# Patient Record
Sex: FEMALE | Race: BLACK OR AFRICAN AMERICAN | Employment: UNEMPLOYED | URBAN - METROPOLITAN AREA
[De-identification: names, ages, dates, MRNs, and addresses within clinical notes are randomized per-mention and may not be internally consistent; named-entity substitution may affect disease eponyms.]

---

## 2021-07-27 ENCOUNTER — TELEPHONE (OUTPATIENT)
Dept: NEUROLOGY | Age: 61
End: 2021-07-27

## 2021-07-27 NOTE — TELEPHONE ENCOUNTER
[facility] wants to know if we can treat this patient. Referral has been dropped off to Dr. Michelle Farr nurse. Please call back once we have answer.

## 2021-11-16 ENCOUNTER — OFFICE VISIT (OUTPATIENT)
Dept: NEUROLOGY | Age: 61
End: 2021-11-16
Payer: COMMERCIAL

## 2021-11-16 VITALS
RESPIRATION RATE: 20 BRPM | BODY MASS INDEX: 27.71 KG/M2 | WEIGHT: 182.8 LBS | OXYGEN SATURATION: 98 % | SYSTOLIC BLOOD PRESSURE: 164 MMHG | HEART RATE: 94 BPM | HEIGHT: 68 IN | DIASTOLIC BLOOD PRESSURE: 78 MMHG

## 2021-11-16 DIAGNOSIS — G51.31 CLONIC HEMIFACIAL SPASM OF MUSCLE OF RIGHT SIDE OF FACE: Primary | ICD-10-CM

## 2021-11-16 PROCEDURE — 99204 OFFICE O/P NEW MOD 45 MIN: CPT | Performed by: STUDENT IN AN ORGANIZED HEALTH CARE EDUCATION/TRAINING PROGRAM

## 2021-11-16 NOTE — PROGRESS NOTES
Thomas Arroyo is a 62 y.o. female . presents for New Patient and Spasms (clonic hemifacial spasms)   . A 62years old female patient here for evaluation of right-sided facial twitching of 5 years duration. Initially started over the right periorbital area; they did gradually evaluate the right lower face. No involvement of the left side. No previous history of Bell's palsy. Symptoms are intermittent and might increase with stress; might last for up to 30 minutes. She has noticed that it might increase when she is sleeping on it; some worsening when for example she is walking in the morning after sleeping on her right side. About 3 or 4 years ago, she was given a Botox injection around her eyes; she said she developed swelling after the injection and did not go back again (she says mainly because of insurance). The twitching stopped for few months. She does not have any abnormal movement over the upper or lower extremities. No difficulty walking. No significant changes in her speech. No problems swallowing but she might occasionally choke. Does not have any previous history of stroke. No facial pain or numbness. No loss of consciousness; no previous history of seizure. She says she has previous MRI; could not get it in [facility]. Review of Systems   Constitutional: Negative for chills, fever and weight loss. HENT: Negative for hearing loss and tinnitus. Eyes: Negative for blurred vision and double vision. Respiratory: Negative for cough and shortness of breath. Cardiovascular: Positive for leg swelling (mild left ). Negative for chest pain. Gastrointestinal: Positive for abdominal pain. Negative for diarrhea, heartburn, nausea and vomiting. Genitourinary: Positive for dysuria (mild). Negative for frequency and urgency. Musculoskeletal: Positive for neck pain (mild soreness). Negative for back pain. Skin: Negative for itching and rash.    Neurological: Positive for sensory change (left hand) and focal weakness (right  hand ). Negative for dizziness, tingling, tremors, speech change and headaches. Psychiatric/Behavioral: Negative for depression. No past medical history on file. No past surgical history on file. No family history on file. Social History     Socioeconomic History    Marital status: SINGLE     Spouse name: Not on file    Number of children: Not on file    Years of education: Not on file    Highest education level: Not on file   Occupational History    Not on file   Tobacco Use    Smoking status: Former Smoker    Smokeless tobacco: Never Used   Substance and Sexual Activity    Alcohol use: Never    Drug use: Never    Sexual activity: Not on file   Other Topics Concern    Not on file   Social History Narrative    Not on file     Social Determinants of Health     Financial Resource Strain:     Difficulty of Paying Living Expenses: Not on file   Food Insecurity:     Worried About [address] in the Last Year: Not on file    [address] in the Last Year: Not on file   Transportation Needs:     Lack of Transportation (Medical): Not on file    Lack of Transportation (Non-Medical):  Not on file   Physical Activity:     Days of Exercise per Week: Not on file    Minutes of Exercise per Session: Not on file   Stress:     Feeling of Stress : Not on file   Social Connections:     Frequency of Communication with Friends and Family: Not on file    Frequency of Social Gatherings with Friends and Family: Not on file    Attends Zoroastrianism Services: Not on file    Active Member of Clubs or Organizations: Not on file    Attends Club or Organization Meetings: Not on file    Marital Status: Not on file   Intimate Partner Violence:     Fear of Current or Ex-Partner: Not on file    Emotionally Abused: Not on file    Physically Abused: Not on file    Sexually Abused: Not on file   Housing Stability:     Unable to Pay for Housing in the Last Year: Not on file    Number of Places Lived in the Last Year: Not on file    Unstable Housing in the Last Year: Not on file        Allergies   Allergen Reactions    Prochlorperazine Other (comments)     Pt states \"jaw locked up\" after she took it for the first time               Physical Exam  Constitutional:       Appearance: Normal appearance. HENT:      Head: Normocephalic and atraumatic. Mouth/Throat:      Mouth: Mucous membranes are moist.      Pharynx: Oropharynx is clear. No oropharyngeal exudate. Eyes:      Extraocular Movements: Extraocular movements intact. Pupils: Pupils are equal, round, and reactive to light. Pulmonary:      Effort: Respiratory distress present. Musculoskeletal:         General: Normal range of motion. Cervical back: Normal range of motion and neck supple. Right lower leg: No edema. Left lower leg: No edema. Neurological:      Mental Status: She is alert. Comments: Mental status: Awake, alert, oriented x3, follows simple and complex commands, no neglect, no extinction to DSS or VSS. Speech and languge: fluent, coherent, and comprehension intact  CN: VFF, EOMI, PERRLA, face sensation intact , no facial asymmetry noted, palate elevation symmetric bilat, SS+SCM 5/5 bilat, tongue midline. Visible right periorbital and lower face intermittent twitching with forceful closure of her left eye. Motor: no pronator drift, tone normal throughout, strength 5/5 throughout  Sensory: intact to light touch and PP  throughout  Coordination: FNF, HS accurate w/o dysmetria  DTR: 2+ throughout  Gait:Normal.            No visits with results within 3 Month(s) from this visit. Latest known visit with results is:   No results found for any previous visit. ICD-10-CM ICD-9-CM    1.  Clonic hemifacial spasm of muscle of right side of face  G51.31 351.8 MRI BRAIN W WO CONT      MRA BRAIN WO CONT     A 62years old female patient here for evaluation of right-sided facial twitching; starting from the right periorbital area that involved the lower face. Consistent with right hemifacial spasm. Had a previous trial with Botox; only took it once; there is improvement in the spasm but because of insurance, did not go back. She had swelling around her eyes after the injection; no rash. Patient does not have any abnormal movement in other body parts. Need to rule out intracranial lesions including aberrant arteries compressing the facial nerve. Will get MRI of the brain with contrast and MRA of the brain. Discussed treatment options; she might benefit again from Botox. We will discuss further after the MRIs. We will see her again in 2 months time.

## 2021-11-30 DIAGNOSIS — G51.31 CLONIC HEMIFACIAL SPASM OF MUSCLE OF RIGHT SIDE OF FACE: ICD-10-CM

## 2022-01-07 ENCOUNTER — HOSPITAL ENCOUNTER (OUTPATIENT)
Age: 62
Discharge: HOME | End: 2022-01-07
Attending: STUDENT IN AN ORGANIZED HEALTH CARE EDUCATION/TRAINING PROGRAM
Payer: COMMERCIAL

## 2022-01-07 LAB — CREAT UR-MCNC: 0.5 MG/DL (ref 0.6–1.3)

## 2022-01-07 PROCEDURE — A9575 INJ GADOTERATE MEGLUMI 0.1ML: HCPCS | Performed by: STUDENT IN AN ORGANIZED HEALTH CARE EDUCATION/TRAINING PROGRAM

## 2022-01-07 PROCEDURE — 82565 ASSAY OF CREATININE: CPT

## 2022-01-07 PROCEDURE — 70553 MRI BRAIN STEM W/O & W/DYE: CPT

## 2022-01-07 PROCEDURE — 70544 MR ANGIOGRAPHY HEAD W/O DYE: CPT

## 2022-01-07 PROCEDURE — 74011250636 HC RX REV CODE- 250/636: Performed by: STUDENT IN AN ORGANIZED HEALTH CARE EDUCATION/TRAINING PROGRAM

## 2022-01-07 RX ADMIN — GADOTERATE MEGLUMINE 17 ML: 376.9 INJECTION INTRAVENOUS at 12:06

## 2022-01-09 ENCOUNTER — HOSPITAL ENCOUNTER (EMERGENCY)
Age: 62
Discharge: HOME | End: 2022-01-09
Attending: STUDENT IN AN ORGANIZED HEALTH CARE EDUCATION/TRAINING PROGRAM
Payer: COMMERCIAL

## 2022-01-09 VITALS
TEMPERATURE: 98.6 F | SYSTOLIC BLOOD PRESSURE: 159 MMHG | RESPIRATION RATE: 18 BRPM | DIASTOLIC BLOOD PRESSURE: 89 MMHG | OXYGEN SATURATION: 100 % | HEART RATE: 82 BPM

## 2022-01-09 DIAGNOSIS — Z20.822 CONTACT WITH AND (SUSPECTED) EXPOSURE TO COVID-19: ICD-10-CM

## 2022-01-09 DIAGNOSIS — R10.84 ABDOMINAL PAIN, GENERALIZED: ICD-10-CM

## 2022-01-09 DIAGNOSIS — B34.9 VIRAL SYNDROME: Primary | ICD-10-CM

## 2022-01-09 LAB
ALBUMIN SERPL-MCNC: 3.9 G/DL (ref 3.4–5)
ALBUMIN/GLOB SERPL: 1.2 {RATIO} (ref 0.8–1.7)
ALP SERPL-CCNC: 108 U/L (ref 45–117)
ALT SERPL-CCNC: 21 U/L (ref 13–56)
ANION GAP SERPL CALC-SCNC: 2 MMOL/L (ref 3–18)
APPEARANCE UR: CLEAR
AST SERPL-CCNC: 17 U/L (ref 10–38)
BASOPHILS # BLD: 0 K/UL (ref 0–0.1)
BASOPHILS NFR BLD: 1 % (ref 0–2)
BILIRUB SERPL-MCNC: 0.9 MG/DL (ref 0.2–1)
BILIRUB UR QL: NEGATIVE
BUN SERPL-MCNC: 9 MG/DL (ref 7–18)
BUN/CREAT SERPL: 18 (ref 12–20)
CALCIUM SERPL-MCNC: 9.2 MG/DL (ref 8.5–10.1)
CHLORIDE SERPL-SCNC: 109 MMOL/L (ref 100–111)
CO2 SERPL-SCNC: 29 MMOL/L (ref 21–32)
COLOR UR: YELLOW
CREAT SERPL-MCNC: 0.51 MG/DL (ref 0.6–1.3)
DIFFERENTIAL METHOD BLD: ABNORMAL
EOSINOPHIL # BLD: 0.2 K/UL (ref 0–0.4)
EOSINOPHIL NFR BLD: 5 % (ref 0–5)
ERYTHROCYTE [DISTWIDTH] IN BLOOD BY AUTOMATED COUNT: 11.8 % (ref 11.6–14.5)
GLOBULIN SER CALC-MCNC: 3.3 G/DL (ref 2–4)
GLUCOSE SERPL-MCNC: 150 MG/DL (ref 74–99)
GLUCOSE UR STRIP.AUTO-MCNC: NEGATIVE MG/DL
HCT VFR BLD AUTO: 35.2 % (ref 35–45)
HGB BLD-MCNC: 12 G/DL (ref 12–16)
HGB UR QL STRIP: NEGATIVE
IMM GRANULOCYTES # BLD AUTO: 0 K/UL (ref 0–0.04)
IMM GRANULOCYTES NFR BLD AUTO: 0 % (ref 0–0.5)
KETONES UR QL STRIP.AUTO: NEGATIVE MG/DL
LEUKOCYTE ESTERASE UR QL STRIP.AUTO: NEGATIVE
LIPASE SERPL-CCNC: 93 U/L (ref 73–393)
LYMPHOCYTES # BLD: 1.5 K/UL (ref 0.9–3.6)
LYMPHOCYTES NFR BLD: 38 % (ref 21–52)
MCH RBC QN AUTO: 31.3 PG (ref 24–34)
MCHC RBC AUTO-ENTMCNC: 34.1 G/DL (ref 31–37)
MCV RBC AUTO: 91.7 FL (ref 78–100)
MONOCYTES # BLD: 0.3 K/UL (ref 0.05–1.2)
MONOCYTES NFR BLD: 8 % (ref 3–10)
NEUTS SEG # BLD: 1.8 K/UL (ref 1.8–8)
NEUTS SEG NFR BLD: 48 % (ref 40–73)
NITRITE UR QL STRIP.AUTO: NEGATIVE
NRBC # BLD: 0 K/UL (ref 0–0.01)
NRBC BLD-RTO: 0 PER 100 WBC
PH UR STRIP: 6.5 [PH] (ref 5–8)
PLATELET # BLD AUTO: 300 K/UL (ref 135–420)
PMV BLD AUTO: 9.5 FL (ref 9.2–11.8)
POTASSIUM SERPL-SCNC: 4.3 MMOL/L (ref 3.5–5.5)
PROT SERPL-MCNC: 7.2 G/DL (ref 6.4–8.2)
PROT UR STRIP-MCNC: NEGATIVE MG/DL
RBC # BLD AUTO: 3.84 M/UL (ref 4.2–5.3)
SARS-COV-2, COV2: NORMAL
SODIUM SERPL-SCNC: 140 MMOL/L (ref 136–145)
SP GR UR REFRACTOMETRY: 1.01 (ref 1–1.03)
TROPONIN-HIGH SENSITIVITY: 6 NG/L (ref 0–54)
UROBILINOGEN UR QL STRIP.AUTO: 0.2 EU/DL (ref 0.2–1)
WBC # BLD AUTO: 3.8 K/UL (ref 4.6–13.2)

## 2022-01-09 PROCEDURE — 83690 ASSAY OF LIPASE: CPT

## 2022-01-09 PROCEDURE — 81003 URINALYSIS AUTO W/O SCOPE: CPT

## 2022-01-09 PROCEDURE — 80053 COMPREHEN METABOLIC PANEL: CPT

## 2022-01-09 PROCEDURE — 99282 EMERGENCY DEPT VISIT SF MDM: CPT

## 2022-01-09 PROCEDURE — 85025 COMPLETE CBC W/AUTO DIFF WBC: CPT

## 2022-01-09 PROCEDURE — 84484 ASSAY OF TROPONIN QUANT: CPT

## 2022-01-09 PROCEDURE — U0003 INFECTIOUS AGENT DETECTION BY NUCLEIC ACID (DNA OR RNA); SEVERE ACUTE RESPIRATORY SYNDROME CORONAVIRUS 2 (SARS-COV-2) (CORONAVIRUS DISEASE [COVID-19]), AMPLIFIED PROBE TECHNIQUE, MAKING USE OF HIGH THROUGHPUT TECHNOLOGIES AS DESCRIBED BY CMS-2020-01-R: HCPCS

## 2022-01-09 RX ORDER — OXYCODONE AND ACETAMINOPHEN 5; 325 MG/1; MG/1
1 TABLET ORAL
Status: DISCONTINUED | OUTPATIENT
Start: 2022-01-09 | End: 2022-01-09

## 2022-01-09 NOTE — ED PROVIDER NOTES
EMERGENCY DEPARTMENT HISTORY AND PHYSICAL EXAM    Date: 1/9/2022  Patient Name: Jose Armando Bridges    History of Presenting Illness     Chief Complaint   Patient presents with    Headache    Fatigue    Abdominal Pain         History Provided By: Patient  Additional History (Context): Jose Armando Bridges is a 62 y.o. female with No significant past medical history who presents with plaint of headache fatigue abdominal discomfort nausea but denies vomiting diarrheal stools melena hematochezia fever cough. She is vaccinated for Covid x1 shot. PCP: Other, MD Rivera        Past History     Past Medical History:  No past medical history on file. Past Surgical History:  No past surgical history on file. Family History:  No family history on file. Social History:  Social History     Tobacco Use    Smoking status: Former Smoker    Smokeless tobacco: Never Used   Substance Use Topics    Alcohol use: Never    Drug use: Never       Allergies: Allergies   Allergen Reactions    Prochlorperazine Other (comments)     Pt states \"jaw locked up\" after she took it for the first time         Review of Systems   Review of Systems   Constitutional: Positive for fatigue. Negative for fever. HENT: Negative for sore throat. Eyes: Negative. Respiratory: Negative for cough and shortness of breath. Cardiovascular: Negative for chest pain. Gastrointestinal: Positive for abdominal pain. Negative for diarrhea and vomiting. Endocrine: Negative. Genitourinary: Negative for dysuria and flank pain. Musculoskeletal: Positive for myalgias. Skin: Negative. Allergic/Immunologic: Negative. Neurological: Positive for headaches. Hematological: Negative. Psychiatric/Behavioral: Negative. All Other Systems Negative  Physical Exam     Vitals:    01/09/22 1150   BP: (!) 159/89   Pulse: 82   Resp: 18   Temp: 98.6 °F (37 °C)   SpO2: 100%     Physical Exam  Vitals and nursing note reviewed.    Constitutional: Appearance: She is well-developed. HENT:      Head: Normocephalic and atraumatic. Eyes:      Pupils: Pupils are equal, round, and reactive to light. Neck:      Thyroid: No thyromegaly. Vascular: No JVD. Trachea: No tracheal deviation. Cardiovascular:      Rate and Rhythm: Normal rate and regular rhythm. Heart sounds: Normal heart sounds. No murmur heard. No friction rub. No gallop. Pulmonary:      Effort: Pulmonary effort is normal. No respiratory distress. Breath sounds: Normal breath sounds. No stridor. No wheezing or rales. Chest:      Chest wall: No tenderness. Abdominal:      General: There is no distension. Palpations: Abdomen is soft. There is no mass. Tenderness: There is generalized abdominal tenderness. There is no guarding or rebound. Comments: Mild TTP   Musculoskeletal:         General: No tenderness. Lymphadenopathy:      Cervical: No cervical adenopathy. Skin:     General: Skin is warm and dry. Coloration: Skin is not pale. Findings: No erythema or rash. Neurological:      Mental Status: She is alert and oriented to person, place, and time. Psychiatric:         Behavior: Behavior normal.         Thought Content: Thought content normal.          Diagnostic Study Results     Labs -     Recent Results (from the past 12 hour(s))   CBC WITH AUTOMATED DIFF    Collection Time: 01/09/22 12:05 PM   Result Value Ref Range    WBC 3.8 (L) 4.6 - 13.2 K/uL    RBC 3.84 (L) 4.20 - 5.30 M/uL    HGB 12.0 12.0 - 16.0 g/dL    HCT 35.2 35.0 - 45.0 %    MCV 91.7 78.0 - 100.0 FL    MCH 31.3 24.0 - 34.0 PG    MCHC 34.1 31.0 - 37.0 g/dL    RDW 11.8 11.6 - 14.5 %    PLATELET 580 837 - 782 K/uL    MPV 9.5 9.2 - 11.8 FL    NRBC 0.0 0  WBC    ABSOLUTE NRBC 0.00 0.00 - 0.01 K/uL    NEUTROPHILS 48 40 - 73 %    LYMPHOCYTES 38 21 - 52 %    MONOCYTES 8 3 - 10 %    EOSINOPHILS 5 0 - 5 %    BASOPHILS 1 0 - 2 %    IMMATURE GRANULOCYTES 0 0.0 - 0.5 %    ABS. NEUTROPHILS 1.8 1.8 - 8.0 K/UL    ABS. LYMPHOCYTES 1.5 0.9 - 3.6 K/UL    ABS. MONOCYTES 0.3 0.05 - 1.2 K/UL    ABS. EOSINOPHILS 0.2 0.0 - 0.4 K/UL    ABS. BASOPHILS 0.0 0.0 - 0.1 K/UL    ABS. IMM. GRANS. 0.0 0.00 - 0.04 K/UL    DF AUTOMATED     METABOLIC PANEL, COMPREHENSIVE    Collection Time: 01/09/22 12:05 PM   Result Value Ref Range    Sodium 140 136 - 145 mmol/L    Potassium 4.3 3.5 - 5.5 mmol/L    Chloride 109 100 - 111 mmol/L    CO2 29 21 - 32 mmol/L    Anion gap 2 (L) 3.0 - 18 mmol/L    Glucose 150 (H) 74 - 99 mg/dL    BUN 9 7.0 - 18 MG/DL    Creatinine 0.51 (L) 0.6 - 1.3 MG/DL    BUN/Creatinine ratio 18 12 - 20      GFR est AA >60 >60 ml/min/1.73m2    GFR est non-AA >60 >60 ml/min/1.73m2    Calcium 9.2 8.5 - 10.1 MG/DL    Bilirubin, total 0.9 0.2 - 1.0 MG/DL    ALT (SGPT) 21 13 - 56 U/L    AST (SGOT) 17 10 - 38 U/L    Alk.  phosphatase 108 45 - 117 U/L    Protein, total 7.2 6.4 - 8.2 g/dL    Albumin 3.9 3.4 - 5.0 g/dL    Globulin 3.3 2.0 - 4.0 g/dL    A-G Ratio 1.2 0.8 - 1.7     LIPASE    Collection Time: 01/09/22 12:05 PM   Result Value Ref Range    Lipase 93 73 - 393 U/L   TROPONIN-HIGH SENSITIVITY    Collection Time: 01/09/22 12:05 PM   Result Value Ref Range    Troponin-High Sensitivity 6 0 - 54 ng/L   URINALYSIS W/ RFLX MICROSCOPIC    Collection Time: 01/09/22 12:05 PM   Result Value Ref Range    Color YELLOW      Appearance CLEAR      Specific gravity 1.013 1.005 - 1.030      pH (UA) 6.5 5.0 - 8.0      Protein Negative NEG mg/dL    Glucose Negative NEG mg/dL    Ketone Negative NEG mg/dL    Bilirubin Negative NEG      Blood Negative NEG      Urobilinogen 0.2 0.2 - 1.0 EU/dL    Nitrites Negative NEG      Leukocyte Esterase Negative NEG     SARS-COV-2    Collection Time: 01/09/22 12:20 PM   Result Value Ref Range    SARS-CoV-2 Please find results under separate order         Radiologic Studies -   No orders to display     CT Results  (Last 48 hours)    None        CXR Results  (Last 48 hours)    None Medical Decision Making   I am the first provider for this patient. I reviewed the vital signs, available nursing notes, past medical history, past surgical history, family history and social history. Vital Signs-Reviewed the patient's vital signs. Records Reviewed: Nursing Notes    Procedures:  Procedures    Provider Notes (Medical Decision Making): labs, VS stable. Complaining of generalized abdominal discomfort as well as fatigue body wide myalgias. Have swab for Covid advised to isolate till test result returns. MED RECONCILIATION:  No current facility-administered medications for this encounter. No current outpatient medications on file. Disposition:  home    DISCHARGE NOTE:   1:18 PM    Pt has been reexamined. Patient has no new complaints, changes, or physical findings. Care plan outlined and precautions discussed. Results of labs were reviewed with the patient. All medications were reviewed with the patient. All of pt's questions and concerns were addressed. Patient was instructed and agrees to follow up with PCP, as well as to return to the ED upon further deterioration. Patient is ready to go home. Follow-up Information     Follow up With Specialties Details [address]  Schedule an appointment as soon as possible for a visit in 2 days  [place] [phone]    [facility] EMERGENCY DEPT Emergency Medicine  If symptoms worsen return immediately [address]  [phone]          There are no discharge medications for this patient. Diagnosis     Clinical Impression:   1. Viral syndrome    2. Abdominal pain, generalized    3.  Contact with and (suspected) exposure to covid-19

## 2022-01-20 ENCOUNTER — OFFICE VISIT (OUTPATIENT)
Dept: NEUROLOGY | Age: 62
End: 2022-01-20
Payer: COMMERCIAL

## 2022-01-20 VITALS
DIASTOLIC BLOOD PRESSURE: 84 MMHG | RESPIRATION RATE: 18 BRPM | OXYGEN SATURATION: 98 % | HEART RATE: 78 BPM | SYSTOLIC BLOOD PRESSURE: 148 MMHG | BODY MASS INDEX: 27.37 KG/M2 | WEIGHT: 180.6 LBS | HEIGHT: 68 IN

## 2022-01-20 DIAGNOSIS — G51.31 CLONIC HEMIFACIAL SPASM OF MUSCLE OF RIGHT SIDE OF FACE: Primary | ICD-10-CM

## 2022-01-20 PROCEDURE — 99214 OFFICE O/P EST MOD 30 MIN: CPT | Performed by: STUDENT IN AN ORGANIZED HEALTH CARE EDUCATION/TRAINING PROGRAM

## 2022-01-20 NOTE — PROGRESS NOTES
Fior Alba is a 62 y.o. female . presents for Follow-up   . A 62years old female patient here for follow-up evaluation of right-sided hemifacial spasm and follow-up of her MRI results. She claims the spasms since her last visit is a little better; she has started taking vitamins and magnesium. The MRI of the brain has shown nonspecific signal changes identified in the mid hugo as which may represent some chronic small vessel disease to the hugo. MRI of the brain has shown dominant right PICA branch incomplete section for right vertebral; Cannot exclude an element of vascular compression to the right 7th cranial nerve. Patient told me that the previously while she was in [place], the possibility of vascular compression of the nerve was discussed with her including the surgical option. No weakness of her extremities. No difficulty walking. From initial encounter:  A 62years old female patient here for evaluation of right-sided facial twitching of 5 years duration. Initially started over the right periorbital area; they did gradually evaluate the right lower face. No involvement of the left side. No previous history of Bell's palsy. Symptoms are intermittent and might increase with stress; might last for up to 30 minutes. She has noticed that it might increase when she is sleeping on it; some worsening when for example she is walking in the morning after sleeping on her right side. About 3 or 4 years ago, she was given a Botox injection around her eyes; she said she developed swelling after the injection and did not go back again (she says mainly because of insurance). The twitching stopped for few months. She does not have any abnormal movement over the upper or lower extremities. No difficulty walking. No significant changes in her speech. No problems swallowing but she might occasionally choke. Does not have any previous history of stroke. No facial pain or numbness.   No loss of consciousness; no previous history of seizure. She says she has previous MRI; could not get it in [facility]. Follow-up  Pertinent negatives include no chest pain, no headaches and no shortness of breath. Review of Systems   Constitutional: Negative for chills, fever and weight loss. HENT: Negative for hearing loss and tinnitus. Eyes: Negative for blurred vision and double vision. Respiratory: Negative for cough and shortness of breath. Cardiovascular: Negative for chest pain and leg swelling. Gastrointestinal: Negative for heartburn, nausea and vomiting. Genitourinary: Negative for dysuria, frequency and urgency. Musculoskeletal: Negative for back pain and neck pain. Skin: Negative for itching and rash. Neurological: Positive for sensory change (left hand) and focal weakness (right  hand ). Negative for dizziness, tingling, tremors, speech change and headaches. Psychiatric/Behavioral: Negative for depression. No past medical history on file. No past surgical history on file. No family history on file. Social History     Socioeconomic History    Marital status: SINGLE     Spouse name: Not on file    Number of children: Not on file    Years of education: Not on file    Highest education level: Not on file   Occupational History    Not on file   Tobacco Use    Smoking status: Former Smoker    Smokeless tobacco: Never Used   Substance and Sexual Activity    Alcohol use: Never    Drug use: Never    Sexual activity: Not on file   Other Topics Concern    Not on file   Social History Narrative    Not on file     Social Determinants of Health     Financial Resource Strain:     Difficulty of Paying Living Expenses: Not on file   Food Insecurity:     Worried About [address] in the Last Year: Not on file    [address] in the Last Year: Not on file   Transportation Needs:     Lack of Transportation (Medical):  Not on file    Lack of Transportation (Non-Medical): Not on file   Physical Activity:     Days of Exercise per Week: Not on file    Minutes of Exercise per Session: Not on file   Stress:     Feeling of Stress : Not on file   Social Connections:     Frequency of Communication with Friends and Family: Not on file    Frequency of Social Gatherings with Friends and Family: Not on file    Attends [employer] Services: Not on file    Active Member of [address] [place] [address] or Organizations: Not on file    Attends Club or Organization Meetings: Not on file    Marital Status: Not on file   Intimate Partner Violence:     Fear of Current or Ex-Partner: Not on file    Emotionally Abused: Not on file    Physically Abused: Not on file    Sexually Abused: Not on file   Housing Stability:     Unable to Pay for Housing in the Last Year: Not on file    Number of [place] in the Last Year: Not on file    Unstable Housing in the Last Year: Not on file        Allergies   Allergen Reactions    Prochlorperazine Other (comments)     Pt states \"jaw locked up\" after she took it for the first time               Physical Exam  Constitutional:       Appearance: Normal appearance. HENT:      Head: Normocephalic and atraumatic. Mouth/Throat:      Mouth: Mucous membranes are moist.      Pharynx: Oropharynx is clear. No oropharyngeal exudate. Eyes:      Extraocular Movements: Extraocular movements intact. Pupils: Pupils are equal, round, and reactive to light. Pulmonary:      Effort: Pulmonary effort is normal. No respiratory distress. Musculoskeletal:         General: Normal range of motion. Cervical back: Normal range of motion and neck supple. Right lower leg: No edema. Left lower leg: No edema. Neurological:      Mental Status: She is alert. Comments: Mental status: Awake, alert, oriented x3, follows simple and complex commands, no neglect, no extinction to DSS or VSS.   Speech and languge: fluent, coherent, and comprehension intact  CN: VFF, EOMI, PERRLA, face sensation intact , no facial asymmetry noted, palate elevation symmetric bilat, SS+SCM 5/5 bilat, tongue midline. Visible right periorbital and lower face intermittent twitching with forceful closure of her left eye. Motor: no pronator drift, tone normal throughout, strength 5/5 throughout  Sensory: intact to light touch and PP  throughout  Coordination: FNF, HS accurate w/o dysmetria  DTR: 2+ throughout  Gait:Normal.            Admission on 01/09/2022, Discharged on 01/09/2022   Component Date Value Ref Range Status    WBC 01/09/2022 3.8* 4.6 - 13.2 K/uL Final    RBC 01/09/2022 3.84* 4.20 - 5.30 M/uL Final    HGB 01/09/2022 12.0  12.0 - 16.0 g/dL Final    HCT 01/09/2022 35.2  35.0 - 45.0 % Final    MCV 01/09/2022 91.7  78.0 - 100.0 FL Final    MCH 01/09/2022 31.3  24.0 - 34.0 PG Final    MCHC 01/09/2022 34.1  31.0 - 37.0 g/dL Final    RDW 01/09/2022 11.8  11.6 - 14.5 % Final    PLATELET 56/81/1399 568  135 - 420 K/uL Final    MPV 01/09/2022 9.5  9.2 - 11.8 FL Final    NRBC 01/09/2022 0.0  0  WBC Final    ABSOLUTE NRBC 01/09/2022 0.00  0.00 - 0.01 K/uL Final    NEUTROPHILS 01/09/2022 48  40 - 73 % Final    LYMPHOCYTES 01/09/2022 38  21 - 52 % Final    MONOCYTES 01/09/2022 8  3 - 10 % Final    EOSINOPHILS 01/09/2022 5  0 - 5 % Final    BASOPHILS 01/09/2022 1  0 - 2 % Final    IMMATURE GRANULOCYTES 01/09/2022 0  0.0 - 0.5 % Final    ABS. NEUTROPHILS 01/09/2022 1.8  1.8 - 8.0 K/UL Final    ABS. LYMPHOCYTES 01/09/2022 1.5  0.9 - 3.6 K/UL Final    ABS. MONOCYTES 01/09/2022 0.3  0.05 - 1.2 K/UL Final    ABS. EOSINOPHILS 01/09/2022 0.2  0.0 - 0.4 K/UL Final    ABS. BASOPHILS 01/09/2022 0.0  0.0 - 0.1 K/UL Final    ABS. IMM.  GRANS. 01/09/2022 0.0  0.00 - 0.04 K/UL Final    DF 01/09/2022 AUTOMATED    Final    Sodium 01/09/2022 140  136 - 145 mmol/L Final    Potassium 01/09/2022 4.3  3.5 - 5.5 mmol/L Final    Chloride 01/09/2022 109  100 - 111 mmol/L Final    CO2 01/09/2022 29  21 - 32 mmol/L Final    Anion gap 01/09/2022 2* 3.0 - 18 mmol/L Final    Glucose 01/09/2022 150* 74 - 99 mg/dL Final    BUN 01/09/2022 9  7.0 - 18 MG/DL Final    Creatinine 01/09/2022 0.51* 0.6 - 1.3 MG/DL Final    BUN/Creatinine ratio 01/09/2022 18  12 - 20   Final    GFR est AA 01/09/2022 >60  >60 ml/min/1.73m2 Final    GFR est non-AA 01/09/2022 >60  >60 ml/min/1.73m2 Final    Comment: (NOTE)  Estimated GFR is calculated using the Modification of Diet in Renal   Disease (MDRD) Study equation, reported for both  Americans   (GFRAA) and non- Americans (GFRNA), and normalized to 1.73m2   body surface area. The physician must decide which value applies to   the patient. The MDRD study equation should only be used in   individuals age 25 or older. It has not been validated for the   following: pregnant women, patients with serious comorbid conditions,   or on certain medications, or persons with extremes of body size,   muscle mass, or nutritional status.  Calcium 01/09/2022 9.2  8.5 - 10.1 MG/DL Final    Bilirubin, total 01/09/2022 0.9  0.2 - 1.0 MG/DL Final    ALT (SGPT) 01/09/2022 21  13 - 56 U/L Final    AST (SGOT) 01/09/2022 17  10 - 38 U/L Final    Alk. phosphatase 01/09/2022 108  45 - 117 U/L Final    Protein, total 01/09/2022 7.2  6.4 - 8.2 g/dL Final    Albumin 01/09/2022 3.9  3.4 - 5.0 g/dL Final    Globulin 01/09/2022 3.3  2.0 - 4.0 g/dL Final    A-G Ratio 01/09/2022 1.2  0.8 - 1.7   Final    Lipase 01/09/2022 93  73 - 393 U/L Final    Troponin-High Sensitivity 01/09/2022 6  0 - 54 ng/L Final    Up to 50% of normal patients may have low levels of detectable troponin (within reference range) circulating in the blood. Mild elevations in troponin that are above the reference range, may be present with other cardiac and non-cardiac disease states.  Two or three serial tests at two hour intervals, are recommended to evaluate changes in circulating troponin over time.    Color 01/09/2022 YELLOW    Final    Appearance 01/09/2022 CLEAR    Final    Specific gravity 01/09/2022 1.013  1.005 - 1.030   Final    pH (UA) 01/09/2022 6.5  5.0 - 8.0   Final    Protein 01/09/2022 Negative  NEG mg/dL Final    Glucose 01/09/2022 Negative  NEG mg/dL Final    Ketone 01/09/2022 Negative  NEG mg/dL Final    Bilirubin 01/09/2022 Negative  NEG   Final    Blood 01/09/2022 Negative  NEG   Final    Urobilinogen 01/09/2022 0.2  0.2 - 1.0 EU/dL Final    Nitrites 01/09/2022 Negative  NEG   Final    Leukocyte Esterase 01/09/2022 Negative  NEG   Final    SARS-CoV-2 01/09/2022 Please find results under separate order    Final   Hospital Outpatient Visit on 01/07/2022   Component Date Value Ref Range Status    Creatinine, POC 01/07/2022 0.5* 0.6 - 1.3 MG/DL Final    GFRAA, POC 01/07/2022 >60  >60 ml/min/1.73m2 Final    GFRNA, POC 01/07/2022 >60  >60 ml/min/1.73m2 Final    Estimated GFR is calculated using the IDMS-traceable Modification of Diet in Renal Disease (MDRD) Study equation, reported for both  Americans (GFRAA) and non- Americans (GFRNA), and normalized to 1.73m2 body surface area. The physician must decide which value applies to the patient.      Study Result    Narrative & Impression   EXAM: MRA BRAIN WO CONT     CLINICAL INDICATIONS/HISTORY: Hemifacial spasms of the right side of the face     COMPARISON: MRI done same day     TECHNIQUE: The [place] vasculature was assessed with axial 3D TOF  source images from near foramen magnum up to sylvian region, appropriately  reformatted.     FINDINGS:      Right Anterior Circulation-   Right ICA: Unremarkable  Right MCA: Unremarkable  Right MIL: Unremarkable     ACOM: Not well-visualized     Left Anterior Circulation-  Left ICA: Unremarkable   Left MCA: Unremarkable  Left MIL: Unremarkable     PCOM: Bilateral fetal circulation identified small posterior communicating  branches are also identified     Posterior circulation-   Vertebral arteries: Patent. The left is dominant right terminates as a PICA  branch  Basilar: Unremarkable. Branches: Bilateral PCA are patent.     The right PICA branch is mildly tortuous extends up to the level of the IAC and  is seen in the region of the root entry zone of the 7th and 8th cranial nerve  there is not appear to be any significant flexion of the nerve but there may be  contact     This may be consistent with vascular compression to the 7th cranial nerve        IMPRESSION     Dominant right PICA branch incomplete section for right vertebral     Cannot exclude an element of vascular compression to the right 7th cranial nerve     Study Result    Narrative & Impression   EXAM: MRI BRAIN W WO CONT     CLINICAL INDICATION/HISTORY: Right-sided facial twitching 5 years colonic  hemifacial spasm     TECHNIQUE: Multisequence multiplanar MR imaging acquired through the brain.      Contrast used: 17 cc Gadavist     COMPARISON: None     FINDINGS:     Parenchyma: Mid pontine region demonstrates focal increased signal consistent  with areas of gliosis there are some subtle increased on the diffusion images in  this region consistent with passive not active diffusion     Findings are nonspecific gliotic changes may be secondary to [place]  degeneration although there is no evidence of any significant supratentorial  deep white matter changes versus some chronic ischemic changes to the hugo     Diffusion images demonstrate no evidence of an acute infarction No acute  infarction.      The left cerebellar hemisphere demonstrates a small well marginated defect in  the central portion of the hemisphere consistent with area of small ischemic  injury versus a simple perivascular space enlargement this defect measures 5 x 3  mm no additional findings are seen no atrophy of the cerebral hemispheres noted  No mass lesion.  No pathologic enhancement.     CSF spaces: Ventricles and cisterns remain midline in position     IAC regions: Unremarkable     Parasellar region: Unremarkable     Vasculature: Appropriate flow voids within the major skull base vasculature.     Cervicomedullary junction: Patent     Orbits: Unremarkable     Paranasal sinuses: Clear      There are some textural changes in the parotid glands bilaterally with some  increased accentuation of the intraglandular fibrous stroma question some mild  chronic sialoadenitis     IMPRESSION     Some nonspecific signal changes identified in the mid hugo as discussed above  may represent some chronic small vessel disease to the hugo secondary  inflammatory changes from prior infection cannot be excluded     Does not have the appearance of a demyelinating process     No active pathology is identified             ICD-10-CM ICD-9-CM    1. [place] hemifacial spasm of muscle of right side of face  G51.31 351.8 REFERRAL TO NEUROLOGY     A 62years old female patient here for follow-up of right-sided hemifacial spasm. During the last visit, an MRI of the brain was ordered; it showed nonspecific what appears to be microvascular change over the hugo. The MRA of the brain has shown a dominant right PICA; contact with the 7th nerve was not ruled out. Patient previously had 1 session of Botox; was not continued because of insurance. Had some improvement at the time. Will refer her to movement disorder. In addition, discussed evaluation by neurosurgery. Patient at this time do not want any surgery. She will follow-up with movement disorder. We will see her as needed.

## 2024-08-15 ENCOUNTER — NEW PATIENT (OUTPATIENT)
Dept: URBAN - METROPOLITAN AREA CLINIC 1 | Facility: CLINIC | Age: 64
End: 2024-08-15

## 2024-08-15 DIAGNOSIS — H04.123: ICD-10-CM

## 2024-08-15 DIAGNOSIS — E11.3493: ICD-10-CM

## 2024-08-15 DIAGNOSIS — H25.813: ICD-10-CM

## 2024-08-15 DIAGNOSIS — G51.0: ICD-10-CM

## 2024-08-15 PROCEDURE — 92004 COMPRE OPH EXAM NEW PT 1/>: CPT

## 2024-08-15 ASSESSMENT — VISUAL ACUITY
OD_CC: J1
OD_BAT: 20/30
OS_SC: 20/25-1
OS_BAT: 20/60
OS_CC: J1
OD_SC: 20/25

## 2024-08-15 ASSESSMENT — TONOMETRY
OS_IOP_MMHG: 16
OD_IOP_MMHG: 20

## 2025-02-24 ENCOUNTER — FOLLOW UP (OUTPATIENT)
Age: 65
End: 2025-02-24

## 2025-02-24 DIAGNOSIS — H25.813: ICD-10-CM

## 2025-02-24 DIAGNOSIS — E11.3493: ICD-10-CM

## 2025-02-24 PROCEDURE — 92015 DETERMINE REFRACTIVE STATE: CPT

## 2025-02-24 PROCEDURE — 92012 INTRM OPH EXAM EST PATIENT: CPT
